# Patient Record
Sex: MALE | Race: BLACK OR AFRICAN AMERICAN | URBAN - NONMETROPOLITAN AREA
[De-identification: names, ages, dates, MRNs, and addresses within clinical notes are randomized per-mention and may not be internally consistent; named-entity substitution may affect disease eponyms.]

---

## 2017-02-16 ENCOUNTER — HISTORY (OUTPATIENT)
Dept: EMERGENCY MEDICINE | Facility: OTHER | Age: 47
End: 2017-02-16

## 2017-03-13 ENCOUNTER — HISTORY (OUTPATIENT)
Dept: EMERGENCY MEDICINE | Facility: OTHER | Age: 47
End: 2017-03-13

## 2017-03-17 ENCOUNTER — HISTORY (OUTPATIENT)
Dept: EMERGENCY MEDICINE | Facility: OTHER | Age: 47
End: 2017-03-17

## 2017-09-27 ENCOUNTER — OFFICE VISIT - GICH (OUTPATIENT)
Dept: FAMILY MEDICINE | Facility: OTHER | Age: 47
End: 2017-09-27

## 2017-09-27 ENCOUNTER — HISTORY (OUTPATIENT)
Dept: FAMILY MEDICINE | Facility: OTHER | Age: 47
End: 2017-09-27

## 2017-09-27 DIAGNOSIS — H92.02 OTALGIA OF LEFT EAR: ICD-10-CM

## 2017-09-27 DIAGNOSIS — J03.90 ACUTE TONSILLITIS: ICD-10-CM

## 2017-10-13 ENCOUNTER — OFFICE VISIT - GICH (OUTPATIENT)
Dept: ORTHOPEDICS | Facility: OTHER | Age: 47
End: 2017-10-13

## 2017-10-13 ENCOUNTER — HISTORY (OUTPATIENT)
Dept: ORTHOPEDICS | Facility: OTHER | Age: 47
End: 2017-10-13

## 2017-10-13 DIAGNOSIS — M77.8 OTHER ENTHESOPATHIES, NOT ELSEWHERE CLASSIFIED: ICD-10-CM

## 2017-10-13 DIAGNOSIS — S63.501A SPRAIN OF RIGHT WRIST: ICD-10-CM

## 2017-10-17 ENCOUNTER — AMBULATORY - GICH (OUTPATIENT)
Dept: SCHEDULING | Facility: OTHER | Age: 47
End: 2017-10-17

## 2017-10-17 ENCOUNTER — COMMUNICATION - GICH (OUTPATIENT)
Dept: ORTHOPEDICS | Facility: OTHER | Age: 47
End: 2017-10-17

## 2017-11-03 ENCOUNTER — HISTORY (OUTPATIENT)
Dept: ORTHOPEDICS | Facility: OTHER | Age: 47
End: 2017-11-03

## 2017-11-03 ENCOUNTER — OFFICE VISIT - GICH (OUTPATIENT)
Dept: ORTHOPEDICS | Facility: OTHER | Age: 47
End: 2017-11-03

## 2017-11-03 DIAGNOSIS — M77.8 OTHER ENTHESOPATHIES, NOT ELSEWHERE CLASSIFIED: ICD-10-CM

## 2017-11-03 DIAGNOSIS — M25.531 PAIN IN RIGHT WRIST: ICD-10-CM

## 2017-11-08 ENCOUNTER — HOSPITAL ENCOUNTER (OUTPATIENT)
Dept: PHYSICAL THERAPY | Facility: OTHER | Age: 47
Setting detail: THERAPIES SERIES
End: 2017-11-08
Attending: ORTHOPAEDIC SURGERY

## 2017-11-08 DIAGNOSIS — M25.531 PAIN IN RIGHT WRIST: ICD-10-CM

## 2017-11-14 ENCOUNTER — AMBULATORY - GICH (OUTPATIENT)
Dept: FAMILY MEDICINE | Facility: OTHER | Age: 47
End: 2017-11-14

## 2017-11-14 DIAGNOSIS — Z23 ENCOUNTER FOR IMMUNIZATION: ICD-10-CM

## 2017-11-15 ENCOUNTER — AMBULATORY - GICH (OUTPATIENT)
Dept: SCHEDULING | Facility: OTHER | Age: 47
End: 2017-11-15

## 2017-11-15 ENCOUNTER — HOSPITAL ENCOUNTER (OUTPATIENT)
Dept: PHYSICAL THERAPY | Facility: OTHER | Age: 47
Setting detail: THERAPIES SERIES
End: 2017-11-15
Attending: ORTHOPAEDIC SURGERY

## 2017-11-17 ENCOUNTER — HOSPITAL ENCOUNTER (OUTPATIENT)
Dept: PHYSICAL THERAPY | Facility: OTHER | Age: 47
Setting detail: THERAPIES SERIES
End: 2017-11-17
Attending: ORTHOPAEDIC SURGERY

## 2017-11-17 ENCOUNTER — COMMUNICATION - GICH (OUTPATIENT)
Dept: ORTHOPEDICS | Facility: OTHER | Age: 47
End: 2017-11-17

## 2017-11-22 ENCOUNTER — HOSPITAL ENCOUNTER (OUTPATIENT)
Dept: PHYSICAL THERAPY | Facility: OTHER | Age: 47
Setting detail: THERAPIES SERIES
End: 2017-11-22
Attending: ORTHOPAEDIC SURGERY

## 2017-11-29 ENCOUNTER — HOSPITAL ENCOUNTER (OUTPATIENT)
Dept: PHYSICAL THERAPY | Facility: OTHER | Age: 47
Setting detail: THERAPIES SERIES
End: 2017-11-29
Attending: ORTHOPAEDIC SURGERY

## 2017-12-01 ENCOUNTER — OFFICE VISIT - GICH (OUTPATIENT)
Dept: ORTHOPEDICS | Facility: OTHER | Age: 47
End: 2017-12-01

## 2017-12-01 ENCOUNTER — HISTORY (OUTPATIENT)
Dept: ORTHOPEDICS | Facility: OTHER | Age: 47
End: 2017-12-01

## 2017-12-01 DIAGNOSIS — M77.8 OTHER ENTHESOPATHIES, NOT ELSEWHERE CLASSIFIED: ICD-10-CM

## 2017-12-06 ENCOUNTER — HOSPITAL ENCOUNTER (OUTPATIENT)
Dept: PHYSICAL THERAPY | Facility: OTHER | Age: 47
Setting detail: THERAPIES SERIES
End: 2017-12-06
Attending: ORTHOPAEDIC SURGERY

## 2017-12-08 ENCOUNTER — HOSPITAL ENCOUNTER (OUTPATIENT)
Dept: PHYSICAL THERAPY | Facility: OTHER | Age: 47
Setting detail: THERAPIES SERIES
End: 2017-12-08
Attending: ORTHOPAEDIC SURGERY

## 2017-12-20 ENCOUNTER — AMBULATORY - GICH (OUTPATIENT)
Dept: RADIOLOGY | Facility: OTHER | Age: 47
End: 2017-12-20

## 2017-12-20 DIAGNOSIS — M25.531 PAIN IN RIGHT WRIST: ICD-10-CM

## 2017-12-20 DIAGNOSIS — G89.29 OTHER CHRONIC PAIN: ICD-10-CM

## 2017-12-27 ENCOUNTER — HOSPITAL ENCOUNTER (OUTPATIENT)
Dept: RADIOLOGY | Facility: OTHER | Age: 47
End: 2017-12-27

## 2017-12-27 DIAGNOSIS — G89.29 OTHER CHRONIC PAIN: ICD-10-CM

## 2017-12-27 DIAGNOSIS — M25.531 PAIN IN RIGHT WRIST: ICD-10-CM

## 2017-12-27 NOTE — PROGRESS NOTES
Patient Information     Patient Name MRN Sex Alin Merlos [id number] Male 1970      Progress Notes by Annmarie Jordan OT at 2017 12:52 PM     Author:  Annmarie Jordan OT Service:  (none) Author Type:  OT- Occupational Therapist     Filed:  2017  1:54 PM Date of Service:  2017 12:52 PM Status:  Signed     :  Annmarie Jordan OT (OT- Occupational Therapist)            [facility]  Outpatient OT - Daily Note          Date of Service:  2017            Visit #:  3  Date of referral: 11/3/2017   Patient Name: Alin Joseph  YOB: 1970   Referring MD/Provider: Dr. Lisa  Diagnosis: Rt wrist pain  Treatment Diagnosis:pain, weakness, decline in self cares  Insurance:  Other: IMcare  Start of Care Date: 2017   Certification periods:  From:   2017  To: 2018    Next doctor visit: Dec 1st with Dr. Decker       Subjective  Patient reports that the wrist had him tossing and turning all night last night.  He reports that the prayer stretch makes his wrist swell. Patient reports he is wondering what is going on with the ulnar styloid, as far as if the break grew back or not yet.     Pain Rating:     today 3.5 /10 down from 4/10 radiating into the palm between the little finger and the ring finger          Objective    Today's Intervention:    High volt electrical stimulation ([place] unit), intensity 110 (monitored and controlled by patient), negative rectangle treatment electrode over ulnar styloid, round positive electrode on dorsum of wrist. 20 minute treatment time, to increase circulation, decrease pain and edema.      Moist hot pack to L/RUE elbow distally to fingertips, during e-stim, 6 layers + hot pack cover, to prepare soft tissue for stretch. 13 minutes application with skin check at 8 minutes. Additional towel provided for patient to utilize if heat became too high.      Following heat and e-stim, PROM with  prolonged stretch to RUE:  Wrist flexion, extension, ulnar and radial deviation. Hand out provided     Manual Therapy to help improve tissue mobility, improve circulation and to decrease tissue tension thru the use of IASTM with the Graston Technique      GT4:,Sweep, fan, through palm, thenar, and hypothenar regions   GT4 sweep volar and palmar side of forearm.   GT4: gently sweep over anatomical snuff box with varying degrees of stretch and slack  GT4: sweep dorsum of hand  GT6: frame metacarpals and dorsal interossei   tip of GT6: strum intercarpal ligaments dorsal wrist with varying tension   GT 4 Scoop ligaments of dorsal and jc wrist ligaments         ** patient was informed of potential treatment responses produced by IASTM. S/he was informed to instruct provider with any discomfort or pain.      Continuous ultrasound using 10% Ketoprofen gel, 5 cm sound head at 1.0 Mhz, 1.0 w/cm2 to ulnar styloid and TFCC  (location) to decrease pain and inflammation. 8 minutes application, 5 minutes prep/clean-up/rationale. Patient was instructed to advise OT if a dull ache or uncomfortable heat were felt.      Home Exercise Program: 11/8 stretch of the wrist into flexion, extension, supination, and ulnar and radial deviation.     Assessment    Therapist Assessment/Response to Intervention:  Pain has increased since last session.  Tolerates stretches. Held off on strengthening due to increase in pain.     Goals:     Problem list includes:  Pain with grasping, swelling with use.      Patient s goal: to get everything back on track so I can get back to work.      Functional short term goals (established in collaboration with the patient, to be met in 8 weeks):  1. Patient will report the ability to lift over 10 # in his dominate right hand with pain levels < 3/10  2. Patient will report the ability to perform simple meal prep with mild to no difficulty. Currently moderate difficulty.   3. Patient will report the ability to  open a tight jar with mild to no difficulty. Currently severe difficulty.   4. patient will report the ability to sleep through the night with no pain for three nights in a row.         Functional long terms goal to be met in 12 weeks:   A. Patient will be able to return to work   Plan  Plan:  Consider ionto patch. Initiate strength when pain is consistently < 3/10    Student has been instructed in and demonstrates skills necessary to carry out above stated treatment plan: Yes    Thank you for your referral to [facility].  Please call with any questions, concerns or comments.  [phone]    Annmarie Jordan, OTR/L  Occupational Therapist
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
## 2017-12-27 NOTE — PROGRESS NOTES
Patient Information     Patient Name MRN Sex Alin Merlos [id number] Male 1970      Progress Notes by Chang Decker DO at 10/13/2017  9:45 AM     Author:  Chang Decker DO Service:  (none) Author Type:  PHYS- Osteopathic     Filed:  10/13/2017 12:08 PM Encounter Date:  10/13/2017 Status:  Signed     :  Chang Decker DO (PHYS- Osteopathic)            Alin Joseph was seen in consultation for Dr. Martin for a chief complaint of right wrist pain.    CHIEF COMPLAINT: Alin Joseph is a 46 y.o.  male  Chief Complaint     Patient presents with       Consult      Right wrist pain doi:10/9/17       HISTORY OF PRESENTING INJURY:  This 46-year-old male is seen at the request of the emergency room provider for right wrist complaints. The patient relates he works at [employer]. He has been there about 4-5 months. Constant use of the hands, normally wearing gloves. Lifting, transporting and carrying products.  Started having pain along the ulnar side of the right wrist 4 days ago on Monday, . No history of trauma. Evaluated in the emergency room the next day and referred to orthopedics. X-rays were taken demonstrating a ulnar styloid fracture that appeared old. No acute fractures.  The patient does not remember any specific wrist injury in the past that may have caused a fracture.  Recent treatment includes ibuprofen and a Ace wrap.  Describes moderate pain to the ulnar side of the wrist and near the ulnar styloid area. Usually worse with wrist movement.  No complaint of bruising, redness or swelling to the area.  No complaint of numbness or tingling at this time    REVIEW OF SYSTEMS:  Constitutional:  Denies constitutional problems  Cardiovascular: normal  Respiratory: normal    The review of systems as documented in the HPI and on the intake questionnaire, completed by the patient 10/13/2017, have been reviewed by myself and the pertinent positives and negatives  "addressed.  The remainder of the complete review of systems was non-contributory.    (PFSH) PAST, FAMILY, and/or SOCIAL HISTORY:    PAST MEDICAL HISTORY:  No past medical history on file.    PAST SURGICAL HISTORY:  No past surgical history on file.    ALLERGIES:  Allergies     Allergen  Reactions     Aspirin *Unknown - Childhood Rxn     Penicillins Hives       CURRENT MEDICATIONS:  Current Outpatient Prescriptions       Medication  Sig Dispense Refill     acetaminophen (TYLENOL) 325 mg tablet Take 650 mg by mouth every 4 hours if needed. Max acetaminophen dose: 4000mg in 24 hrs.   Indications: Pain       ibuprofen (ADVIL; MOTRIN) 600 mg tablet Take 1 tablet by mouth 4 times daily if needed. Maximum of 3200 mg in 24 hours. 60 tablet 0     No current facility-administered medications for this visit.      Medications have been reviewed by me and are current to the best of my knowledge and ability.      FAMILY HISTORY:  No family history on file.    Additional [facility] information documented on the intake form completed by the patient 10/13/2017 was reviewed by myself.    PHYSICAL EXAM:   /76  Pulse 80  Ht 1.702 m (5' 7\")  Wt 81.2 kg (179 lb)  BMI 28.04 kg/m2 Body mass index is 28.04 kg/(m^2).    General Appearance: Pleasant male in good appearance, mood and affect.  Alert and orientated times three ( time, date and location).    Wrist and Hand: Right  right wrist demonstrates no bruising, redness or swelling.  Full passive dorsiflexion and volar flexion on exam with no significant discomfort.  Pain along the ulnar side of the wrist with palpation mostly over the ulnar styloid region. Good strength with active dorsiflexion against resistance.  pain over the sixth extensor compartment and extensor carpi ulnaris with palpation..  Pain with palpation is mostly along the distal ulna border and towards the base of the fifth metacarpal with palpation.  No bruising, redness or swelling of the area.    sensory intact with " touch.  Palpable distal pulse.    Xray/MRI/MRA:  Radiographic images where independently reviewed and discussed with the patient.    Attending Doctor: SUZAN RHOADES ([id number])  :       KYMBERLY HALL ([id number])  Report Date:       10/10/2017 11:02:06  Report Status:       Final  ======================= Begin of Report Content ======================    PROCEDURE: XR WRIST 3 VIEWS RIGHT  HISTORY: WRIST PAIN/PROBLEM; .  COMPARISON: None.  TECHNIQUE: 3 views right wrist.  FINDINGS: There is a well-corticated fragment at the ulnar styloid, suggesting a chronic fracture fragment. There are degenerative changes at the inferomedial lunate. 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 No acute fracture is identified. Mild degenerative changes are found at the first CMC joint.  Electronically Signed By: Miguel Hall on 10/10/2017 10:57 AM    IMPRESSION:  Right wrist ulnar sided wrist pain, recent onset    left right wrist sprain ulnar border   suspect extensor tendinitis along the ulnar side of the wrist, sensor carpi ulnaris  old fracture of the right wrist ulnar styloid, nonunion  no new fractures on x-ray    PLAN:  Discussion included review of x-rays. Discussed with the patient he has findings consistent with an old fracture of the ulnar styloid that did not heal with bone.    he also appears to have pain along the side of the wrist and this location and along the tendon.  Recommendation for immobilization of the wrist.  Okay to ice the area.  Recommend over-the-counter medication such as ibuprofen or Aleve as needed. Caution with use.  Recommend restricted work activities with the right wrist and hand over the next several weeks and recheck progress in about 3 weeks.  Avoid heavy lifting or carrying activities. Avoid constant repetitive use of the wrist.    Chang Decker D.O., F.A.O.A.O.  Orthopedic Surgeon    [address]  [address]  Phone [phone]  Fax [phone]    10:12 AM 10/13/2017

## 2017-12-27 NOTE — PROGRESS NOTES
Patient Information     Patient Name MRN Sex Alin Paul [id number] Male 1970      Progress Notes by Erna Miller OT at 11/15/2017 10:14 AM     Author:  Erna Miller OT Service:  (none) Author Type:  OT- Occupational Therapist     Filed:  11/15/2017 11:16 AM Date of Service:  11/15/2017 10:14 AM Status:  Signed     :  Erna Miller OT (OT- Occupational Therapist)            [facility]  Outpatient OT - Daily Note          Date of Service:  11/15/2017            Visit #:  2  Date of referral: 11/3/2017   Patient Name: Alin Joseph  YOB: 1970   Referring MD/Provider: Dr. Lisa  Diagnosis: Rt wrist pain  Treatment Diagnosis:pain, weakness, decline in self cares  Insurance:  Other: IMcare  Start of Care Date: 2017   Certification periods:  From:   2017  To: 2018        Subjective    Alin reports pain radiating into his palm. He reports doing the exercises with swelling at the ulnar styloid after stretching.         Pain Rating:     today 4/10 radiating into the palm between the little finger and the ring finger  /        Objective    Today's Intervention:      High volt electrical stimulation (PPR1- 300PV unit), intensity 62 (monitored and controlled by patient), negative rectangle treatment electrode over ulnar styloid, round positive electrode on dorsum of wrist. 20 minute treatment time, to increase circulation, decrease pain and edema.      Moist hot pack to L/RUE elbow distally to fingertips, during e-stim, 6 layers + hot pack cover, to prepare soft tissue for stretch. 13 minutes application with skin check at 8 minutes. Additional towel provided for patient to utilize if heat became too high.      Following heat and e-stim, PROM with prolonged stretch to RUE: educated in  Wrist flexion, extension, ulnar and radial deviation. Hand out provided     Manual Therapy to help improve tissue mobility, improve  circulation and to decrease tissue tension thru the use of IASTM with the Graston Technique      GT4:,Sweep, fan, through palm, thenar, and hypothenar regions   GT4 sweep volar and palmar side of forearm.   GT4: gently sweep over anatomical snuff box with varying degrees of stretch and slack  GT4: sweep dorsum of hand  GT6: frame metacarpals and dorsal interossei   tip of GT6: strum intercarpal ligaments dorsal wrist with varying tension   GT 4 Scoop ligaments of dorsal and jc wrist ligaments         ** patient was informed of potential treatment responses produced by IASTM. S/he was informed to instruct provider with any discomfort or pain.      Continuous ultrasound using 10% Ketoprofen gel, 5 cm sound head at 1.0 Mhz, 1.0 w/cm2 to ulnar styloid and TFCC  (location) to decrease pain and inflammation. 8 minutes application, 5 minutes prep/clean-up/rationale. Patient was instructed to advise OT if a dull ache or uncomfortable heat were felt.     Home Exercise Program: 11/8 stretch of the wrist into flexion, extension, supination, and ulnar and radial deviation.     Assessment    Therapist Assessment/Response to Intervention:  Tightness in the extensor tendons has decreased . Significant tightness in the flexor tendons on the ulnar side that were relaxed after IASTM and stretch.        Goals:  Problem list includes:  Pain with grasping, swelling with use.      Patient s goal: to get everything back on track so I can get back to work.      Functional short term goals (established in collaboration with the patient, to be met in 8 weeks):  1. Patient will report the ability to lift over 10 # in his dominate right hand with pain levels < 3/10  2. Patient will report the ability to perform simple meal prep with mild to no difficulty. Currently moderate difficulty.   3. Patient will report the ability to open a tight jar with mild to no difficulty. Currently severe difficulty.   4. patient will report the ability to  sleep through the night with no pain for three nights in a row.         Functional long terms goal to be met in 12 weeks:   A. Patient will be able to return to work   Plan  Plan:  Consider ionto patch. Initiate strength when pain is < 3/10    Student has been instructed in and demonstrates skills necessary to carry out above stated treatment plan: Yes    Thank you for your referral to [facility].  Please call with any questions, concerns or comments.  [phone]    Erna Miller OTR/L  Occupational Therapist

## 2017-12-28 NOTE — TELEPHONE ENCOUNTER
Patient Information     Patient Name MRN Sex Alin Merlos [id number] Male 1970      Telephone Encounter by Anabel Bardales at 10/17/2017  5:04 PM     Author:  Anabel Bardales Service:  (none) Author Type:  (none)     Filed:  10/17/2017  5:05 PM Encounter Date:  10/17/2017 Status:  Signed     :  Anabel Bardales            Spoke with patient and notified new work status report will be up front to  and that Dr. Decker agreed to the 10 hour. Anabel Bardales LPBREE......................10/17/2017 5:04 PM

## 2017-12-28 NOTE — TELEPHONE ENCOUNTER
Patient Information     Patient Name MRN Sex Alin Merlos [id number] Male 1970      Telephone Encounter by Gosselin, Norma J at 2017 10:59 AM     Author:  Gosselin, Norma J Service:  (none) Author Type:  (none)     Filed:  2017 11:00 AM Encounter Date:  2017 Status:  Signed     :  Gosselin, Norma J            Patient has appointment coming up.  This note will be closed.  Norma J Gosselin LPN....................  2017   10:59 AM

## 2017-12-28 NOTE — PATIENT INSTRUCTIONS
Patient Information     Patient Name MRN Sex Alin Merlos [id number] Male 1970      Patient Instructions by Wendy Riddle NP at 2017  1:15 PM     Author:  Wendy Riddle NP Service:  (none) Author Type:  PHYS- Nurse Practitioner     Filed:  2017  2:15 PM Encounter Date:  2017 Status:  Signed     :  Wendy Riddle NP (PHYS- Nurse Practitioner)            Azithromycin once daily x 5 days     Heat to ear/neck    Ibuprofen every 8 hours as needed      Salt water gargles 3 x day      Follow up if worsening or concerns

## 2017-12-28 NOTE — TELEPHONE ENCOUNTER
Patient Information     Patient Name MRN Sex Alin Merlos [id number] Male 1970      Telephone Encounter by Erna Segovia at 2017  2:23 PM     Author:  Erna Segovia Service:  (none) Author Type:  (none)     Filed:  2017  2:28 PM Encounter Date:  2017 Status:  Signed     :  Erna Segovia            Trying to reach patient to speak about unemployment forms that need filling out.  Dr Decker is needing clarity for question #2    Erna Segovia LPN 2017 2:27 PM

## 2017-12-28 NOTE — TELEPHONE ENCOUNTER
Patient Information     Patient Name MRN Sex Alin Merlos [id number] Male 1970      Telephone Encounter by Gosselin, Norma J at 2017  4:27 PM     Author:  Gosselin, Norma J Service:  (none) Author Type:  (none)     Filed:  2017  4:28 PM Encounter Date:  2017 Status:  Signed     :  Gosselin, Norma J            Patient needs appointment to fill out form with Dr Decker.  Norma J Gosselin LPN....................  2017   4:27 PM

## 2017-12-28 NOTE — PROGRESS NOTES
Patient Information     Patient Name MRN Sex Alin Paul [id number] Male 1970      Progress Notes by Wendy Riddle NP at 2017  1:15 PM     Author:  Wendy Riddle NP Service:  (none) Author Type:  PHYS- Nurse Practitioner     Filed:  2017  6:27 PM Encounter Date:  2017 Status:  Signed     :  Wendy Riddle NP (PHYS- Nurse Practitioner)            HPI:    Alin Joseph is a 46 y.o. male who presents to clinic today for ear/throat pain.   Left sided ear and throat pain for the past couple of days, worsening today.   Denies any dental pain.   Denies fevers.  Mild pain with swallowing.  Left side of throat/neck feels slightly swollen.  Occasional buzzing in left ear.  Wears ear plugs at work.  No ear pressure or plugged sensation.  No sinus pressure.  Mild headache.  No runny or stuffy nose.  No cough.  Appetite normal.  Energy fair.  Taking ibuprofen.          No past medical history on file.  No past surgical history on file.  Social History        Substance Use Topics          Smoking status:   Current Every Day Smoker      Packs/day:  0.50      Types:  Cigarettes      Start date:  1/3/1992      Smokeless tobacco:   Never Used      Alcohol use   0.0 oz/week     0 Standard drinks or equivalent per week        Comment: social       Current Outpatient Prescriptions       Medication  Sig Dispense Refill     acetaminophen (TYLENOL) 325 mg tablet Take 650 mg by mouth every 4 hours if needed. Max acetaminophen dose: 4000mg in 24 hrs.   Indications: Pain       ibuprofen (ADVIL; MOTRIN) 600 mg tablet Take 1 tablet by mouth 4 times daily if needed. Maximum of 3200 mg in 24 hours. 60 tablet 0     No current facility-administered medications for this visit.      Medications have been reviewed by me and are current to the best of my knowledge and ability.    Allergies     Allergen  Reactions     Aspirin *Unknown - Childhood Rxn     Penicillins Hives       Past medical  "history, past surgical history, current medications and allergies reviewed and accurate to the best of my knowledge.        ROS:  Refer to HPI    /80  Pulse 82  Temp 96.7  F (35.9  C) (Tympanic)   Ht 1.74 m (5' 8.5\")  Wt 81.2 kg (179 lb)  BMI 26.82 kg/m2    EXAM:  General Appearance: Well appearing adult male, appropriate appearance for age. No acute distress  Head: normocephalic, atraumatic  Ears: Left TM with bony landmarks appreciated, no erythema, no effusion, no bulging, no purulence.  Right TM with bony landmarks appreciated, no erythema, no effusion, no bulging, no purulence.   Left auditory canal clear.  Right auditory canal clear.  Normal external ears, non tender.  Eyes: conjunctivae normal without erythema or irritation, no drainage or crusting, no eyelid swelling, pupils equal   Orophayrnx: moist mucous membranes, posterior pharynx with generalized erythema, tonsils with 2+ hypertrophy with erythema, no exudates or petechiae, no post nasal drip seen, no oral lesions.    Neck: Left sided tenderness to palpation, no palpable lymph node enlargement   Respiratory: normal chest wall and respirations.  Normal effort.  Clear to auscultation bilaterally, no wheezing, crackles or rhonchi.  No increased work of breathing.  No cough appreciated.  Cardiac: RRR with no murmurs  Musculoskeletal:  Normal gait.  Equal movement of bilateral upper extremities.  Equal movement of bilateral lower extremities.    Dermatological: no facial rash or swelling   Psychological: normal affect, alert and pleasant            ASSESSMENT/PLAN:    ICD-10-CM    1. Acute tonsillitis, unspecified etiology J03.90 azithromycin (ZITHROMAX Z-ESAU) 250 mg tablet   2. Referred ear pain, left H92.02 azithromycin (ZITHROMAX Z-ESAU) 250 mg tablet         Azithromycin daily x 5 days for left sided tonsillitis without signs of abscess  Encouraged fluids and bland diet as tolerated   Symptomatic treatment - salt water gargles, honey, " elevation,  lozenges, heat, etc   Tylenol or ibuprofen PRN  Discussed warning signs/symptoms  Follow up if symptoms persist or worsen or concerns          Patient Instructions   Azithromycin once daily x 5 days     Heat to ear/neck    Ibuprofen every 8 hours as needed      Salt water gargles 3 x day      Follow up if worsening or concerns
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
## 2017-12-28 NOTE — PROGRESS NOTES
Patient Information     Patient Name MRN Sex Alin Merlos [id number] Male 1970      Progress Notes by Erna Miller OT at 2017  2:12 PM     Author:  Erna Miller OT Service:  (none) Author Type:  OT- Occupational Therapist     Filed:  2017  3:08 PM Date of Service:  2017  2:12 PM Status:  Signed     :  Erna Miller OT (OT- Occupational Therapist)            [facility]  Outpatient OT - Daily Note          Date of Service:  11/15/2017            Visit #:  2  Date of referral: 11/3/2017   Patient Name: Alin Joseph  YOB: 1970   Referring MD/Provider: Dr. Lisa  Diagnosis: Rt wrist pain  Treatment Diagnosis:pain, weakness, decline in self cares  Insurance:  Other: IMcare  Start of Care Date: 2017   Certification periods:  From:   2017  To: 2018        Subjective    Alin reports his pain levels has been down to 0-2/10. He wears his orthosis when he is out of the building. He has been able to do more cooking activities with less pain.       Pain Rating:     today 2/10 down from 4/10 radiating into the palm between the little finger and the ring finger  /        Objective    Today's Intervention:      High volt electrical stimulation (PPR1- 300PV unit), intensity 62 (monitored and controlled by patient), negative rectangle treatment electrode over ulnar styloid, round positive electrode on dorsum of wrist. 20 minute treatment time, to increase circulation, decrease pain and edema.      Moist hot pack to L/RUE elbow distally to fingertips, during e-stim, 6 layers + hot pack cover, to prepare soft tissue for stretch. 13 minutes application with skin check at 8 minutes. Additional towel provided for patient to utilize if heat became too high.      Following heat and e-stim, PROM with prolonged stretch to RUE: educated in  Wrist flexion, extension, ulnar and radial deviation. Hand out  provided     Manual Therapy to help improve tissue mobility, improve circulation and to decrease tissue tension thru the use of IASTM with the Graston Technique      GT4:,Sweep, fan, through palm, thenar, and hypothenar regions   GT4 sweep volar and palmar side of forearm.   GT4: gently sweep over anatomical snuff box with varying degrees of stretch and slack  GT4: sweep dorsum of hand  GT6: frame metacarpals and dorsal interossei   tip of GT6: strum intercarpal ligaments dorsal wrist with varying tension   GT 4 Scoop ligaments of dorsal and jc wrist ligaments         ** patient was informed of potential treatment responses produced by IASTM. S/he was informed to instruct provider with any discomfort or pain.      Continuous ultrasound using 10% Ketoprofen gel, 5 cm sound head at 1.0 Mhz, 1.0 w/cm2 to ulnar styloid and TFCC  (location) to decrease pain and inflammation. 8 minutes application, 5 minutes prep/clean-up/rationale. Patient was instructed to advise OT if a dull ache or uncomfortable heat were felt.     Home Exercise Program: 11/8 stretch of the wrist into flexion, extension, supination, and ulnar and radial deviation.     Assessment    Therapist Assessment/Response to Intervention:   Pain is responding to current treatment.     Goals:  Problem list includes:  Pain with grasping, swelling with use.      Patient s goal: to get everything back on track so I can get back to work.      Functional short term goals (established in collaboration with the patient, to be met in 8 weeks):  1. Patient will report the ability to lift over 10 # in his dominate right hand with pain levels < 3/10  2. Patient will report the ability to perform simple meal prep with mild to no difficulty. Currently moderate difficulty.   3. Patient will report the ability to open a tight jar with mild to no difficulty. Currently severe difficulty.   4. patient will report the ability to sleep through the night with no pain for three  nights in a row.         Functional long terms goal to be met in 12 weeks:   A. Patient will be able to return to work   Plan  Plan:  Consider ionto patch. Initiate strength when pain is < 3/10    Student has been instructed in and demonstrates skills necessary to carry out above stated treatment plan: Yes    Thank you for your referral to [facility].  Please call with any questions, concerns or comments.  [phone]    Erna Miller OTR/L  Occupational Therapist

## 2017-12-30 NOTE — INITIAL ASSESSMENTS
Patient Information     Patient Name MRN Sex Alin Merlos [id number] Male 1970      Initial Assessments by Erna Miller OT at 2017 10:26 AM     Author:  Erna Miller OT Service:  (none) Author Type:  OT- Occupational Therapist     Filed:  2017 11:40 AM Date of Service:  2017 10:26 AM Status:  Signed     :  Erna Miller OT (OT- Occupational Therapist)            [facility]  Outpatient OT   Upper Extremity Initial Evaluation      Date of Service:  2017 Visit #:  1  Date of referral: 11/3/2017   Patient Name: Alin Joseph  YOB: 1970   Referring MD/Provider: Dr. Lisa  Diagnosis: Rt wrist pain  Treatment Diagnosis:pain, weakness, decline in self cares  Insurance:  Other: IMcare  Start of Care Date: 2017   Certification periods:  From:   2017  To: 2018      Subjective:        Summary of injury/illnes/exacerbation:  Alin is a 46 year old male who is referred to skilled OT for right wrist pain. This started a few weeks ago while he was working at the [employer]. His wrist swelled overnight around the ulnar styloid. He has been released form work due to the injury. He is wearing a wrist brace that is helping with pain control. He report the pain has woken him up at night and limits his ability to perform meal prep, household tasks, and heavy lifting.       Pain Rating:    At rest 1-3 with use 8/10  / Location:  Ulnar side of right wrist moves into the little finger   Level of Functional Ability prior to above: independent in self cares and home management tasks.   Current functional limitations due to above:  Precautions:  Limited at work to pulling 20#,    Cognition:  Oriented to Person, Place, and Time.     Were cultural / age or other special adaptations needed? No      Patient is a vulnerable adult: No      Patient is aware of diagnosis: Yes      Risks and benefits explained:  Yes  PMH: No past medical history on file.   [place] in [place] [address]  Reviewed with patient    Imaging: PROCEDURE: XR WRIST 3 VIEWS RIGHT  HISTORY: WRIST PAIN/PROBLEM; .  COMPARISON: None.  TECHNIQUE: 3 views right wrist.  FINDINGS: There is a well-corticated fragment at the ulnar styloid, suggesting a chronic fracture fragment. There are degenerative changes at the inferomedial lunate. 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 No acute fracture is identified. Mild degenerative changes are found at the first CMC joint.  Electronically Signed By: Miguel Moreno on 10/10/2017 10:57 AM      Prior level of function: patient is I in all areas of self cares. He lives with his finance.     Fall Risk Screening:  Have you fallen 2 more more times in the past year? no  Have you fallen and had an injury in the past year? no  If the patient answers yes to either of the above questions, a Timed Up and Go (TUG) test will be performed. A referral to physical therapy will be initiated if: 1) TUG test of greater than 13.5 seconds, or 2) inability to participate in the TUG test due to needing assistance by another person for ambulation.        G Codes and Modifier taken from patient completing the Disabilities of the ARM, Shoulder, and Hand Assessed on 11/8/2017 :  DASH score: 43.1  Optional work module: off work    Initial Primary G Code and Modifier:    Per the Patient's intake and/or assessment the Primary G Code is: Self Care .     The Patient's Impairment, Limitation or Restriction Modifier would be best described as: CK - 40% - 60% Impairment.     Goal Primary G Code and Modifier:    The Patient's G Code Goal would be: Self Care      The Patient's Impairment, Limitation or Restriction Modifier goal would be best described as: CI - 1% - 20% Impairment.   Objective:    Hand Dominance:  right    ROM Norms Right Left Strength Right Norms Left Norms   Elbow Extension 0    55 with splint    withoout 50P 116.8 95 112.8   Elbow Flexion 140   Lateral pinch 20 25.6 30 25.1   Supination 90   3 point pinch  19P 24.5 19 25.4   Pronation 90   2 point pinch 18P 17.8 17 17.7   Wrist Flexion 80 67 90        Wrist Extension 70 47 84        Radial Deviation 20 17 22         Ulnar Deviation 30 30 34        P = pain with testing  (Therapist use only: dynamometer #   , pinch gauge #    )  **measurements in degrees      Other findings:  Finger ROM is WNL  Reports numbness in little finger and half of ring finger when gripping hard      Assessment:    Signs and symptoms are consistent with:  Non-union of ulnar styloid fracture, 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     Patient s goal: to get everything back on track so I can get back to work.     Functional short term goals (established in collaboration with the patient, to be met in 8 weeks):  1. Patient will report the ability to lift over 10 # in his dominate right hand with pain levels < 3/10  2. Patient will report the ability to perform simple meal prep with mild to no difficulty. Currently moderate difficulty.   3. Patient will report the ability to open a tight jar with mild to no difficulty. Currently severe difficulty.   4. patient will report the ability to sleep through the night with no pain for three nights in a row.       Functional long terms goal to be met in 12 weeks:   A. Patient will be able to return to work       Rehab potential: Good  for stated goals.    Risk, benefits, and alternatives were discussed with patient. Patient agrees with the plan of care.    Today s treatment included: evaluation, High volt electrical stimulation (PPR1- 300PV unit), intensity 62 (monitored and controlled by patient), negative rectangle treatment electrode over ulnar styloid, round positive electrode on dorsum of wrist. 20 minute treatment time, to increase circulation, decrease pain and edema.     Moist hot pack  to L/RUE elbow distally to fingertips, during e-stim, 6 layers + hot pack cover, to prepare soft tissue for stretch. 13 minutes application with skin check at 8 minutes. Additional towel provided for patient to utilize if heat became too high.     Following heat and e-stim, PROM with prolonged stretch to RUE: educated in  Wrist flexion, extension, ulnar and radial deviation. Hand out provided    Manual Therapy to help improve tissue mobility, improve circulation and to decrease tissue tension thru the use of IASTM with the Graston Technique     GT4:,Sweep, fan, through palm, thenar, and hypothenar regions   GT4 sweep volar and palmar side of forearm.   GT4: gently sweep over anatomical snuff box with varying degrees of stretch and slack  GT4: sweep dorsum of hand  GT6: frame metacarpals and dorsal interossei   tip of GT6: strum intercarpal ligaments dorsal wrist with varying tension   GT 4 Scoop ligaments of dorsal and jc wrist ligaments       ** patient was informed of potential treatment responses produced by IASTM. S/he was informed to instruct provider with any discomfort or pain.       Response to intervention: reported heat felt good. Able to attain increase in ROM after heat. Pian on ulnar side of wrist and dorsa side of wrist distal to ulna and radial junction with wrist flexion and extension.     Plan:    Treatment Plan:    Home programming  Therapeutic exercise   Self care/ home management   Manual therapy   Therapeutic activities   NMR   Ultrasound   Phonophoresis (10% Ketoprofen)   Iontophoresis (.2% Dexamethasone)   Superficial modalities prn   Electrical stimulation, including NMES   Orthotic management   Cognitive skills development   Standardized testing   ROM hand measurements  Other    Frequency/Duration:  Up to 16 visits for 8 weeks    Plan:  Consider ionto patch, initiate ultrasound with keto     Student has been instructed in and demonstrates skills necessary to carry out above stated  treatment plan.     Thank you for your referral to [facility]. Please call with any questions, concerns or comments [phone].      Erna Miller OTR/L  Occupational Therapist

## 2017-12-30 NOTE — NURSING NOTE
Patient Information     Patient Name MRN Sex Alin Merlos [id number] Male 1970      Nursing Note by Gosselin, Norma J at 11/3/2017  9:30 AM     Author:  Gosselin, Norma J Service:  (none) Author Type:  (none)     Filed:  11/3/2017  9:24 AM Encounter Date:  11/3/2017 Status:  Signed     :  Gosselin, Norma J            Follow up right wrist DOI-10/9/17  Norma J Gosselin LPN....................  11/3/2017   9:23 AM

## 2017-12-30 NOTE — NURSING NOTE
Patient Information     Patient Name MRN Sex Alin Merlos [id number] Male 1970      Nursing Note by Gosselin, Norma J at 10/13/2017  9:45 AM     Author:  Gosselin, Norma J Service:  (none) Author Type:  (none)     Filed:  10/13/2017  9:30 AM Encounter Date:  10/13/2017 Status:  Signed     :  Gosselin, Norma J            Consult right wrist pain. DOI:10/9/17  Norma J Gosselin LPN....................  10/13/2017   9:28 AM

## 2017-12-30 NOTE — NURSING NOTE
Patient Information     Patient Name MRN Alin Soriano [id number] Male 1970      Nursing Note by Candida Watkins at 2017  1:15 PM     Author:  Candida Watkins Service:  (none) Author Type:  (none)     Filed:  2017  2:08 PM Encounter Date:  2017 Status:  Signed     :  Candida Watkins            Patient presents in the clinic for ear and throat pain on his left side. Patient states pain started a couple days ago.  Candida Watkins LPN............................ 2017 1:38 PM

## 2018-01-25 VITALS
HEART RATE: 82 BPM | WEIGHT: 179 LBS | SYSTOLIC BLOOD PRESSURE: 122 MMHG | TEMPERATURE: 96.7 F | HEIGHT: 69 IN | DIASTOLIC BLOOD PRESSURE: 80 MMHG | BODY MASS INDEX: 26.51 KG/M2

## 2018-01-25 VITALS
HEART RATE: 80 BPM | HEIGHT: 67 IN | WEIGHT: 179 LBS | DIASTOLIC BLOOD PRESSURE: 76 MMHG | BODY MASS INDEX: 28.09 KG/M2 | SYSTOLIC BLOOD PRESSURE: 124 MMHG

## 2018-01-25 VITALS
BODY MASS INDEX: 28.09 KG/M2 | HEIGHT: 67 IN | HEART RATE: 68 BPM | DIASTOLIC BLOOD PRESSURE: 70 MMHG | WEIGHT: 179 LBS | SYSTOLIC BLOOD PRESSURE: 136 MMHG

## 2018-02-09 VITALS
HEIGHT: 67 IN | DIASTOLIC BLOOD PRESSURE: 80 MMHG | WEIGHT: 179 LBS | HEART RATE: 80 BPM | SYSTOLIC BLOOD PRESSURE: 120 MMHG | BODY MASS INDEX: 28.09 KG/M2

## 2018-02-12 NOTE — DISCHARGE SUMMARY
Patient Information     Patient Name MRN Sex Alin Merlos [id number] Male 1970      Discharge Summaries by Erna Miller OT at 2017  2:31 PM     Author:  Erna Miller OT Service:  (none) Author Type:  OT- Occupational Therapist     Filed:  2017  2:34 PM Date of Service:  2017  2:31 PM Status:  Signed     :  Erna Miller OT (OT- Occupational Therapist)            [facility]  Outpatient OT - Discharge Summary:        Date of discharge: 2017   Date of Service:  2017            Visit #:  6  Date of referral: 11/3/2017   Patient Name: Alin Joseph  YOB: 1970   Referring MD/Provider: Dr. Lisa  Diagnosis: Rt wrist pain  Treatment Diagnosis:pain, weakness, decline in self cares  Insurance:  Other: IMcare  Start of Care Date: 2017   Certification periods:  From:   2017  To: 2018  Next doctor visit: Dec 1st with Dr. Decker       Discharge Summary:   Patient called and cancelled all future appointments stating he needs to have surgery. He will be discharged form skilled OT.      Subjective    Pain Rating:     today 3-4.0 /10 up  from 2/10 radiating into the palm between the little finger and the ring finger . Pain use to be around the radial styloid        Objective    Today's Intervention:      Educated in wrist strengthening exercises with 1#:    -extension, flexion, radial deviation, pro/supinaiton. 20 x 1  Educated in hand gripper with one yellow band x 30     Completed paraffin dip (heat) to right hand/wrist, 5 layers, 3 minute application, to prepare soft tissue for stretch, followed by PROM with prolonged stretch as follows:  Wrist flexion, extension, ulnar and radial deviation. Hand out provided     Manual Therapy to help improve tissue mobility, improve circulation and to decrease tissue tension thru the use of IASTM with the Graston Technique      GT4:,Sweep, fan, through palm,  thenar, and hypothenar regions   GT4 sweep volar and palmar side of forearm.   GT4: gently sweep over anatomical snuff box with varying degrees of stretch and slack  GT4: sweep dorsum of hand  GT6: frame metacarpals and dorsal interossei   tip of GT6: strum intercarpal ligaments dorsal wrist with varying tension   GT. 4 Scoop ligaments of dorsal and jc wrist ligaments    GT 4 swivel between metacarpels   GT 6 swivel palmar head of MC of ring figner      ** patient was informed of potential treatment responses produced by IASTM. S/he was informed to instruct provider with any discomfort or pain.      Educated in tan putty exercises for pinch and .     Pulsed ultrasound 20%, using 2 cm sound head, 1.0 Mhz, .7 w/cm2  to dorsum of hand between 5-3 metacarpals (location), to decrease pain, edema, and enhance soft tissue repair. 8 minutes application, 5 minutes prep/clean-up/rationale.    Ulnar side of wrist. applied to ulnar side of wrist., to decrease inflammation and/or pain. Patient was informed of normal reactions to the iontophoresis treatment, and was instructed to remove the patch after 4 hours. Total iontophoresis set-up/rationale = 8 minutes.       Home Exercise Program: 11/8 stretch of the wrist into flexion, extension, supination, and ulnar and radial deviation. 12/6/2017 tan putty exercises for pinch and . 12/6 Educated in tan putty exercises for pinch and .12/8/2017 wrist strengthening exercises with 1#:  -extension, flexion, radial deviation, pro/supinaiton. 20 x 1Educated in hand gripper with one yellow band x 30     Assessment    Therapist Assessment/Response to Intervention:   No increase in pain with exercises.       Goals:     Problem list includes:  Pain with grasping, swelling with use.      Patient s goal: to get everything back on track so I can get back to work.      Functional short term goals (established in collaboration with the patient, to be met in 8 weeks):  1. Patient will  report the ability to lift over 10 # in his dominate right hand with pain levels < 3/10  2. Patient will report the ability to perform simple meal prep with mild to no difficulty. Currently moderate difficulty.   3. Patient will report the ability to open a tight jar with mild to no difficulty. Currently severe difficulty.   4. patient will report the ability to sleep through the night with no pain for three nights in a row.         Functional long terms goal to be met in 12 weeks:   A. Patient will be able to return to work   Plan  Plan: progress strength as tolerate.     Student has been instructed in and demonstrates skills necessary to carry out above stated treatment plan: Yes    Thank you for your referral to [facility].  Please call with any questions, concerns or comments.  [phone]          Erna Miller OTR/L  Occupational Therapist

## 2018-02-12 NOTE — PROGRESS NOTES
Patient Information     Patient Name MRN Sex Alin Merlos [id number] Male 1970      Progress Notes by Erna Miller OT at 2017 11:02 AM     Author:  Erna Miller OT Service:  (none) Author Type:  OT- Occupational Therapist     Filed:  2017 11:43 AM Date of Service:  2017 11:02 AM Status:  Signed     :  Erna Miller OT (OT- Occupational Therapist)            [facility]  Outpatient OT - Daily Note          Date of Service:  2017            Visit #:  6  Date of referral: 11/3/2017   Patient Name: Alin Joseph  YOB: 1970   Referring MD/Provider: Dr. Lisa  Diagnosis: Rt wrist pain  Treatment Diagnosis:pain, weakness, decline in self cares  Insurance:  Other: IMcare  Start of Care Date: 2017   Certification periods:  From:   2017  To: 2018    Next doctor visit: Dec 1st with Dr. Decker       Subjective          Pain Rating:     today 3-4.0 /10 up  from 2/10 radiating into the palm between the little finger and the ring finger . Pain use to be around the radial styloid        Objective    Today's Intervention:      Educated in wrist strengthening exercises with 1#:    -extension, flexion, radial deviation, pro/supinaiton. 20 x 1  Educated in hand gripper with one yellow band x 30     Completed paraffin dip (heat) to right hand/wrist, 5 layers, 3 minute application, to prepare soft tissue for stretch, followed by PROM with prolonged stretch as follows:  Wrist flexion, extension, ulnar and radial deviation. Hand out provided     Manual Therapy to help improve tissue mobility, improve circulation and to decrease tissue tension thru the use of IASTM with the Graston Technique      GT4:,Sweep, fan, through palm, thenar, and hypothenar regions   GT4 sweep volar and palmar side of forearm.   GT4: gently sweep over anatomical snuff box with varying degrees of stretch and slack  GT4: sweep dorsum of  hand  GT6: frame metacarpals and dorsal interossei   tip of GT6: strum intercarpal ligaments dorsal wrist with varying tension   GT. 4 Scoop ligaments of dorsal and jc wrist ligaments    GT 4 swivel between metacarpels   GT 6 swivel palmar head of MC of ring figner      ** patient was informed of potential treatment responses produced by IASTM. S/he was informed to instruct provider with any discomfort or pain.      Educated in tan putty exercises for pinch and .     Pulsed ultrasound 20%, using 2 cm sound head, 1.0 Mhz, .7 w/cm2  to dorsum of hand between 5-3 metacarpals (location), to decrease pain, edema, and enhance soft tissue repair. 8 minutes application, 5 minutes prep/clean-up/rationale.    Ulnar side of wrist. applied to ulnar side of wrist., to decrease inflammation and/or pain. Patient was informed of normal reactions to the iontophoresis treatment, and was instructed to remove the patch after 4 hours. Total iontophoresis set-up/rationale = 8 minutes.       Home Exercise Program: 11/8 stretch of the wrist into flexion, extension, supination, and ulnar and radial deviation. 12/6/2017 tan putty exercises for pinch and . 12/6 Educated in tan putty exercises for pinch and .12/8/2017 wrist strengthening exercises with 1#:  -extension, flexion, radial deviation, pro/supinaiton. 20 x 1Educated in hand gripper with one yellow band x 30     Assessment    Therapist Assessment/Response to Intervention:   No increase in pain with exercises.       Goals:     Problem list includes:  Pain with grasping, swelling with use.      Patient s goal: to get everything back on track so I can get back to work.      Functional short term goals (established in collaboration with the patient, to be met in 8 weeks):  1. Patient will report the ability to lift over 10 # in his dominate right hand with pain levels < 3/10  2. Patient will report the ability to perform simple meal prep with mild to no difficulty.  Currently moderate difficulty.   3. Patient will report the ability to open a tight jar with mild to no difficulty. Currently severe difficulty.   4. patient will report the ability to sleep through the night with no pain for three nights in a row.         Functional long terms goal to be met in 12 weeks:   A. Patient will be able to return to work   Plan  Plan: progress strength as tolerate.     Student has been instructed in and demonstrates skills necessary to carry out above stated treatment plan: Yes    Thank you for your referral to [facility].  Please call with any questions, concerns or comments.  [phone]          Erna Miller OTR/L  Occupational Therapist

## 2018-02-12 NOTE — PROGRESS NOTES
Patient Information     Patient Name MRN Sex Alin Merlos [id number] Male 1970      Progress Notes by Bebe Bruce R.T. (ARRT) at 2017 10:07 AM     Author:  Bebe Bruce R.T. (ARRT) Service:  (none) Author Type:  [employer] - Registered Radiologic Technologist     Filed:  2017 10:07 AM Date of Service:  2017 10:07 AM Status:  Signed     :  Bebe Bruce R.T. (JACKELYNT) ([facility] - Registered Radiologic Technologist)            [place] Protocol    A. Pre-procedure verification complete yes  1-relevant information / documentation available, reviewed and properly matched to the patient; 2-consent accurate and complete, 3-equipment and supplies available    B. Site marking complete Yes  Site marked if not in continuous attendance with patient    C. TIME OUT completed yes  Time Out was conducted just prior to starting procedure to verify the eight required elements: 1-patient identity, 2-consent accurate and complete, 3-position, 4-correct side/site marked (if applicable), 5-procedure, 6-relevant images / results properly labeled and displayed (if applicable), 7-antibiotics / irrigation fluids (if applicable), 8-safety precautions.

## 2018-02-12 NOTE — PROGRESS NOTES
Patient Information     Patient Name MRN Sex Alin Merlos [id number] Male 1970      [facility] at [facility] 10:07 AM     Author:  Bebe Bruce R.T. (ARRT) Service:  (none) Author Type:  [facility] - Registered Radiologic Technologist     Filed:  2017 10:07 AM Date of Service:  2017 10:07 AM Status:  Signed     :  Bebe Bruce R.T. (ARRT) ([facility] - Registered Radiologic Technologist)            Falls Risk Criteria:    Age 65 and older or under age 4        Sensory deficits    Poor vision    Use of ambulatory aides    Impaired judgment    Unable to walk independently    Meets High Risk criteria for falls:  no

## 2018-02-12 NOTE — PROGRESS NOTES
Patient Information     Patient Name MRN Sex Alin Merlos [id number] Male 1970      Progress Notes by Erna Miller OT at 2017 11:11 AM     Author:  Erna Miller OT Service:  (none) Author Type:  OT- Occupational Therapist     Filed:  2017 11:55 AM Date of Service:  2017 11:11 AM Status:  Signed     :  Erna Miller OT (OT- Occupational Therapist)            [facility]  Outpatient OT - Daily Note          Date of Service:  2017            Visit #:  5  Date of referral: 11/3/2017   Patient Name: Alin Joseph  YOB: 1970   Referring MD/Provider: Dr. Lisa  Diagnosis: Rt wrist pain  Treatment Diagnosis:pain, weakness, decline in self cares  Insurance:  Other: IMcare  Start of Care Date: 2017   Certification periods:  From:   2017  To: 2018    Next doctor visit: Dec 1st with Dr. Decker       Subjective    Patient states he saw the physician and he is on modeerate wieght restrictions of 30-40 pounds max. For next 3 weeks and progress to regular work around       Pain Rating:     today 3-4.0 /10 up  from 2/10 radiating into the palm between the little finger and the ring finger . Pain use to be around the radial styloid        Objective    Today's Intervention:      Completed paraffin dip (heat) to right hand/wrist, 5 layers, 3 minute application, to prepare soft tissue for stretch, followed by PROM with prolonged stretch as follows:  Wrist flexion, extension, ulnar and radial deviation. Hand out provided     Manual Therapy to help improve tissue mobility, improve circulation and to decrease tissue tension thru the use of IASTM with the Graston Technique      GT4:,Sweep, fan, through palm, thenar, and hypothenar regions   GT4 sweep volar and palmar side of forearm.   GT4: gently sweep over anatomical snuff box with varying degrees of stretch and slack  GT4: sweep dorsum of hand  GT6:  frame metacarpals and dorsal interossei   tip of GT6: strum intercarpal ligaments dorsal wrist with varying tension   GT. 4 Scoop ligaments of dorsal and jc wrist ligaments    GT 4 swivel between metacarpels   GT 6 swivel palmar head of MC of ring figner      ** patient was informed of potential treatment responses produced by IAS. S/he was informed to instruct provider with any discomfort or pain.      Educated in tan putty exercises for pinch and .     Pulsed ultrasound 20%, using 2 cm sound head, 1.0 Mhz, .7 w/cm2  to dorsum of hand between 5-3 metacarpals (location), to decrease pain, edema, and enhance soft tissue repair. 8 minutes application, 5 minutes prep/clean-up/rationale.      Home Exercise Program: 11/8 stretch of the wrist into flexion, extension, supination, and ulnar and radial deviation. 12/6/2017 tan putty exercises for pinch and .         Assessment    Therapist Assessment/Response to Intervention:  Pain has change locations to between the 3-5 metacarpals and on the palmar head of the ring finger MCP.  No c/o of caitlin nwith putty use.     Goals:     Problem list includes:  Pain with grasping, swelling with use.      Patient s goal: to get everything back on track so I can get back to work.      Functional short term goals (established in collaboration with the patient, to be met in 8 weeks):  1. Patient will report the ability to lift over 10 # in his dominate right hand with pain levels < 3/10  2. Patient will report the ability to perform simple meal prep with mild to no difficulty. Currently moderate difficulty.   3. Patient will report the ability to open a tight jar with mild to no difficulty. Currently severe difficulty.   4. patient will report the ability to sleep through the night with no pain for three nights in a row.         Functional long terms goal to be met in 12 weeks:   A. Patient will be able to return to work   Plan  Plan: progress strength as tolerate.     Student  has been instructed in and demonstrates skills necessary to carry out above stated treatment plan: Yes    Thank you for your referral to [facility].  Please call with any questions, concerns or comments.  [phone]          Erna Miller OTR/L  Occupational Therapist

## 2018-02-12 NOTE — NURSING NOTE
Patient Information     Patient Name MRN Sex Alin Merlos [id number] Male 1970      Nursing Note by Gosselin, Norma J at 2017  8:30 AM     Author:  Gosselin, Norma J Service:  (none) Author Type:  (none)     Filed:  2017  8:26 AM Encounter Date:  2017 Status:  Signed     :  Gosselin, Norma J            Follow up right wrist DOI-10/9/17 and fill out forms.  Norma J Gosselin LPN....................  2017   8:25 AM

## 2018-02-12 NOTE — PROGRESS NOTES
"Patient Information     Patient Name MRN Sex Alin Merlos [id number] Male 1970      Progress Notes by Chang Decker DO at 2017  8:30 AM     Author:  Chang Decker DO Service:  (none) Author Type:  PHYS- Osteopathic     Filed:  2017  9:07 AM Encounter Date:  2017 Status:  Signed     :  Chang Decker DO (PHYS- Osteopathic)            PROGRESS NOTE    SUBJECTIVE:  Alin Joseph is here for recheck of a  right wrist.     HPI: Patient relates she has been going to occupational therapy for wrist and hand treatment.   At this time he has some pulling sensation in the right hand and he describes the area between the fourth and fifth metacarpals. Some of that extends across the back of the wrist towards the distal forearm.   Previously he had soreness along the ulnar side of the wrist. Some of that is still present.   Otherwise he has been using the hand for home activities.   Relates he was released from his previous site of employment and seeking new employment. Needs some paperwork filled out. .  Pain is mild. Describes pulling sensation and soreness into the hand and generalized to the wrist region as well.     Review of Systems:  Constitutional: Denies constitutional problems    PFSH:  No change in information. See earlier PFSH questionnaire completed by the patient on initial visit.    OBJECTIVE:  /80  Pulse 80  Ht 1.702 m (5' 7\")  Wt 81.2 kg (179 lb)  BMI 28.04 kg/m2 Body mass index is 28.04 kg/(m^2).  General Appearance: Pleasant male in good appearance, mood and affect.  Alert and orientated times three ( time, date and location).    Right wrist/ Hand:  Soft tissue demonstrates no bruising, redness or swelling of the wrist or hand.  As full active flexion and extension of the fingers. No significant pain elicited with movement.  No pain with palpation between the fourth and fifth metacarpals.  Some generalized tenderness with soft tissue of tendons along " the dorsum of the wrist and distal forearm.  Passive wrist dorsiflexion about 80  and volar flexion about 80 .    Radiographic images where independently reviewed and discussed with the patient.   X RAY: Attending Doctor: SUZAN RHOADES ([id number])  :       KYMBERLY HALL ([id number])  Report Date:       10/10/2017 11:02:06  Report Status:       Final  ======================= Begin of Report Content ======================    PROCEDURE: XR WRIST 3 VIEWS RIGHT  HISTORY: WRIST PAIN/PROBLEM; .  COMPARISON: None.  TECHNIQUE: 3 views right wrist.  FINDINGS: There is a well-corticated fragment at the ulnar styloid, suggesting a chronic fracture fragment. There are degenerative changes at the inferomedial lunate. 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 No acute fracture is identified. Mild degenerative changes are found at the first CMC joint.  Electronically Signed By: Miguel Hall on 10/10/2017 10:57 AM    ASSESSMENT:  Right wrist and hand pain, chronic with symptoms beginning around October 9.  Suspect primarily tendinitis of the wrist and hand. Location of discomfort and symptoms appears to change.     PLAN:  Recommendations to continue with occupational therapy for the wrist and hand. Patient relates he has appointment set up for the next several weeks.  He is seeking new employment and needs some paperwork filled out.  I completed some paperwork today and returned it to him. Copies were made.  Discussed with the patient I feel he is capable of work and use of the hand but recommend some continued restrictions regarding limited weight lifting and carrying. Recommend moderate weight restrictions with 30-40 pounds max. Okay to use the right hand at this time. Continue restrictions for the next 3 weeks and progress to regular work activities with the hand beginning around December 22. Discussed with the patient he may still have some discomfort of the hand at that time but I feel would be okay  to progress work.  Continue with ice/heat to the area as needed and over-the-counter nonnarcotic medication if needed.  Questions answered in plan to follow up as needed.    Chang Decker D.O.  Orthopedic Surgeon    [facility]  [address] [place] [address]  Phone [phone]  Fax [phone]    9:00 AM 12/1/2017

## 2018-02-23 ENCOUNTER — DOCUMENTATION ONLY (OUTPATIENT)
Dept: FAMILY MEDICINE | Facility: OTHER | Age: 48
End: 2018-02-23

## 2018-02-23 RX ORDER — IBUPROFEN 600 MG/1
1 TABLET, FILM COATED ORAL EVERY 4 HOURS PRN
COMMUNITY
Start: 2017-03-17

## 2018-02-23 RX ORDER — ACETAMINOPHEN 325 MG/1
650 TABLET ORAL EVERY 4 HOURS PRN
COMMUNITY

## 2018-07-24 NOTE — PROGRESS NOTES
Patient Information     Patient Name  Alin Joseph MRN  [id number] Sex  Male   1970      Letter by Niki Trivedi NP at      Author:  Niki Trivedi NP Service:  (none) Author Type:  (none)    Filed:   Encounter Date:  2017 Status:  (Other)             Work/School Excuse and Restrictions                     Discharged:                                                              2:16 PM  2017        Alin Joseph  was seen today in the [facility] for illness.    Please excuse Alin due to illness on 17.    Alin is able to return to work with no restrictions on 17.             *As an acute care facility, we do not provide follow-up care and are therefore unable to complete paperwork for injuries requiring more than 72 hours away from work. Patients need to follow up with a primary care or occupational health provider.    **If you have questions regarding the validity of this note, please call the number above.            NIKI TRIVEDI NP ....................  2017   2:17 PM